# Patient Record
Sex: MALE | Race: WHITE | NOT HISPANIC OR LATINO | URBAN - METROPOLITAN AREA
[De-identification: names, ages, dates, MRNs, and addresses within clinical notes are randomized per-mention and may not be internally consistent; named-entity substitution may affect disease eponyms.]

---

## 2023-09-21 ENCOUNTER — OFFICE (OUTPATIENT)
Dept: URBAN - METROPOLITAN AREA CLINIC 8 | Facility: CLINIC | Age: 43
End: 2023-09-21

## 2023-09-21 VITALS
HEART RATE: 61 BPM | SYSTOLIC BLOOD PRESSURE: 159 MMHG | OXYGEN SATURATION: 96 % | HEIGHT: 73 IN | WEIGHT: 250 LBS | DIASTOLIC BLOOD PRESSURE: 97 MMHG

## 2023-09-21 DIAGNOSIS — K57.92 DIVERTICULITIS OF INTESTINE, PART UNSPECIFIED, WITHOUT PERFO: ICD-10-CM

## 2023-09-21 DIAGNOSIS — K64.9 UNSPECIFIED HEMORRHOIDS: ICD-10-CM

## 2023-09-21 DIAGNOSIS — K59.00 CONSTIPATION, UNSPECIFIED: ICD-10-CM

## 2023-09-21 DIAGNOSIS — R10.32 LEFT LOWER QUADRANT PAIN: ICD-10-CM

## 2023-09-21 PROCEDURE — 99204 OFFICE O/P NEW MOD 45 MIN: CPT | Performed by: STUDENT IN AN ORGANIZED HEALTH CARE EDUCATION/TRAINING PROGRAM

## 2023-09-21 RX ORDER — SODIUM PICOSULFATE, MAGNESIUM OXIDE, AND ANHYDROUS CITRIC ACID 10; 3.5; 12 MG/160ML; G/160ML; G/160ML
LIQUID ORAL
Qty: 350 | Refills: 0 | Status: COMPLETED
Start: 2023-09-21 | End: 2023-10-30

## 2023-09-21 RX ORDER — HYDROCORTISONE ACETATE 25 MG/1
SUPPOSITORY RECTAL
Qty: 7 | Refills: 3 | Status: ACTIVE
Start: 2023-09-21

## 2023-09-21 NOTE — SERVICEHPINOTES
Mr. Willy Sinclair is a 43-year-old white male with past medical history of hypertension, migraines, who presents to [facility] as a referral for left lower quadrant pain.
br
br clinic visit 09/21/2023: 
br patient reports that over the last year he has had 3 distinct episodes of left lower quadrant pain with associated diarrhea but no fevers or chills.  he usually would just go to see a [facility] and they put him on antibiotics for presumed diverticulitis.  He has never had a colonoscopy or CT scan of his abdomen.  Patient reports that his left lower quadrant pain will usually go away after he is on antibiotics.  He does take occasional NSAIDs due to his migraines.  He is using hyoscyamine as needed for crampy abdominal pain.  He has some longstanding chronic constipation and has a BM about 2 times per week.  No bleeding symptoms.  No unintentional weight loss, fevers chills, dysphagia or nausea vomiting.  No family history of GI malignancies or liver disease.  He does not have any previous abdominal surgeries.  He drinks alcohol occasionally but no other recreational drugs or tobacco.  His most recent labs that I reviewed and back to system showed normal liver enzymes.

## 2023-09-21 NOTE — SERVICENOTES
Patient has 3 episodes of acute left lower quadrant pain over the last 1 year.  He reports he has put on antibiotics for the 3 previous episodes and this helped resolve the pain.  He has never had a CT scan before so I am not sure if this is diverticulitis or other causes such as irritable bowel syndrome but we need to do a colonoscopy to further evaluate the presence of diverticulosis.  I counseled the patient on avoiding NSAIDs.  Continue high-fiber.  will prescribe for him Anusol to treat his hemorrhoids and he can use Recticare OTC.   I discussed with the patient the risks and benefits of the procedure including bleeding, infection, anesthesia related complications.  Patient agreed to proceed with the planned procedure.  all questions addressed. Will have him RTC after his procedure.   If not improving by next visit on over-the-counter MiraLax then will consider a trial of trueLance or Linzess.   I personally reviewed the patient's outside medical records for his visit today.

## 2023-10-30 ENCOUNTER — AMBULATORY SURGICAL CENTER (OUTPATIENT)
Dept: URBAN - METROPOLITAN AREA SURGERY 2 | Facility: SURGERY | Age: 43
End: 2023-10-30

## 2023-10-30 ENCOUNTER — OFFICE (OUTPATIENT)
Dept: URBAN - METROPOLITAN AREA PATHOLOGY 12 | Facility: PATHOLOGY | Age: 43
End: 2023-10-30

## 2023-10-30 VITALS
SYSTOLIC BLOOD PRESSURE: 116 MMHG | HEART RATE: 71 BPM | DIASTOLIC BLOOD PRESSURE: 97 MMHG | OXYGEN SATURATION: 98 % | DIASTOLIC BLOOD PRESSURE: 56 MMHG | SYSTOLIC BLOOD PRESSURE: 125 MMHG | HEART RATE: 73 BPM | RESPIRATION RATE: 17 BRPM | TEMPERATURE: 98.2 F | WEIGHT: 240.8 LBS | HEART RATE: 71 BPM | TEMPERATURE: 98.2 F | RESPIRATION RATE: 17 BRPM | OXYGEN SATURATION: 99 % | OXYGEN SATURATION: 100 % | OXYGEN SATURATION: 100 % | OXYGEN SATURATION: 99 % | OXYGEN SATURATION: 98 % | DIASTOLIC BLOOD PRESSURE: 83 MMHG | SYSTOLIC BLOOD PRESSURE: 125 MMHG | SYSTOLIC BLOOD PRESSURE: 146 MMHG | SYSTOLIC BLOOD PRESSURE: 125 MMHG | DIASTOLIC BLOOD PRESSURE: 68 MMHG | SYSTOLIC BLOOD PRESSURE: 113 MMHG | OXYGEN SATURATION: 99 % | HEART RATE: 72 BPM | HEART RATE: 73 BPM | WEIGHT: 240.8 LBS | OXYGEN SATURATION: 98 % | SYSTOLIC BLOOD PRESSURE: 113 MMHG | HEART RATE: 73 BPM | RESPIRATION RATE: 17 BRPM | SYSTOLIC BLOOD PRESSURE: 116 MMHG | DIASTOLIC BLOOD PRESSURE: 56 MMHG | SYSTOLIC BLOOD PRESSURE: 116 MMHG | RESPIRATION RATE: 18 BRPM | OXYGEN SATURATION: 96 % | HEART RATE: 72 BPM | DIASTOLIC BLOOD PRESSURE: 68 MMHG | RESPIRATION RATE: 19 BRPM | HEART RATE: 71 BPM | RESPIRATION RATE: 19 BRPM | HEIGHT: 73 IN | SYSTOLIC BLOOD PRESSURE: 146 MMHG | OXYGEN SATURATION: 100 % | DIASTOLIC BLOOD PRESSURE: 58 MMHG | SYSTOLIC BLOOD PRESSURE: 113 MMHG | WEIGHT: 240.8 LBS | DIASTOLIC BLOOD PRESSURE: 97 MMHG | SYSTOLIC BLOOD PRESSURE: 118 MMHG | HEART RATE: 72 BPM | SYSTOLIC BLOOD PRESSURE: 118 MMHG | DIASTOLIC BLOOD PRESSURE: 83 MMHG | HEART RATE: 76 BPM | TEMPERATURE: 98.2 F | DIASTOLIC BLOOD PRESSURE: 83 MMHG | OXYGEN SATURATION: 96 % | OXYGEN SATURATION: 96 % | HEIGHT: 73 IN | HEART RATE: 69 BPM | RESPIRATION RATE: 18 BRPM | HEART RATE: 76 BPM | DIASTOLIC BLOOD PRESSURE: 58 MMHG | HEIGHT: 73 IN | RESPIRATION RATE: 19 BRPM | RESPIRATION RATE: 18 BRPM | SYSTOLIC BLOOD PRESSURE: 146 MMHG | SYSTOLIC BLOOD PRESSURE: 118 MMHG | HEART RATE: 76 BPM | DIASTOLIC BLOOD PRESSURE: 97 MMHG | DIASTOLIC BLOOD PRESSURE: 58 MMHG | HEART RATE: 69 BPM | DIASTOLIC BLOOD PRESSURE: 68 MMHG | HEART RATE: 69 BPM | DIASTOLIC BLOOD PRESSURE: 56 MMHG

## 2023-10-30 DIAGNOSIS — K64.4 RESIDUAL HEMORRHOIDAL SKIN TAGS: ICD-10-CM

## 2023-10-30 DIAGNOSIS — R10.32 LEFT LOWER QUADRANT PAIN: ICD-10-CM

## 2023-10-30 DIAGNOSIS — K57.92 DIVERTICULITIS OF INTESTINE, PART UNSPECIFIED, WITHOUT PERFO: ICD-10-CM

## 2023-10-30 DIAGNOSIS — D12.2 BENIGN NEOPLASM OF ASCENDING COLON: ICD-10-CM

## 2023-10-30 DIAGNOSIS — K63.5 POLYP OF COLON: ICD-10-CM

## 2023-10-30 PROCEDURE — 88305 TISSUE EXAM BY PATHOLOGIST: CPT | Mod: TC | Performed by: STUDENT IN AN ORGANIZED HEALTH CARE EDUCATION/TRAINING PROGRAM

## 2023-10-30 PROCEDURE — 45385 COLONOSCOPY W/LESION REMOVAL: CPT | Performed by: STUDENT IN AN ORGANIZED HEALTH CARE EDUCATION/TRAINING PROGRAM

## 2023-10-30 NOTE — SERVICEHPINOTES
Mr. Willy Sinclair is a 43-year-old white male with past medical history of hypertension, migraines, who initially presented to [facility] as a referral for left lower quadrant pain.clinic visit 09/21/2023: brpatient reports that over the last year he has had 3 distinct episodes of left lower quadrant pain with associated diarrhea but no fevers or chills.  he usually would just go to see a minor [facility] and they put him on antibiotics for presumed diverticulitis.  He has never had a colonoscopy or CT scan of his abdomen.  Patient reports that his left lower quadrant pain will usually go away after he is on antibiotics.  He does take occasional NSAIDs due to his migraines.  He is using hyoscyamine as needed for crampy abdominal pain.  He has some longstanding chronic constipation and has a BM about 2 times per week.  No bleeding symptoms.  No unintentional weight loss, fevers chills, dysphagia or nausea vomiting.  No family history of GI malignancies or liver disease.  He does not have any previous abdominal surgeries.  He drinks alcohol occasionally but no other recreational drugs or tobacco.  His most recent labs that I reviewed and back to system showed normal liver enzymes.
br
br    Colonoscopy 10/30/23:  
br
Patient feels well. No recent LLQ pain, fever or chills. He reports some irritation of his hemorrhoids due to the prep. No FHx of colon cancer. Not on any blood thinners.

## 2023-11-01 LAB
GASTRO ONE PATHOLOGY: PDF REPORT: (no result)

## 2024-01-25 ENCOUNTER — OFFICE (OUTPATIENT)
Dept: URBAN - METROPOLITAN AREA CLINIC 8 | Facility: CLINIC | Age: 44
End: 2024-01-25

## 2024-01-25 VITALS
DIASTOLIC BLOOD PRESSURE: 88 MMHG | HEIGHT: 73 IN | OXYGEN SATURATION: 94 % | WEIGHT: 255 LBS | SYSTOLIC BLOOD PRESSURE: 131 MMHG | HEART RATE: 71 BPM

## 2024-01-25 DIAGNOSIS — K64.9 UNSPECIFIED HEMORRHOIDS: ICD-10-CM

## 2024-01-25 DIAGNOSIS — K59.00 CONSTIPATION, UNSPECIFIED: ICD-10-CM

## 2024-01-25 DIAGNOSIS — K63.5 POLYP OF COLON: ICD-10-CM

## 2024-01-25 DIAGNOSIS — R12 HEARTBURN: ICD-10-CM

## 2024-01-25 PROCEDURE — 99214 OFFICE O/P EST MOD 30 MIN: CPT | Performed by: STUDENT IN AN ORGANIZED HEALTH CARE EDUCATION/TRAINING PROGRAM

## 2024-01-25 RX ORDER — OMEPRAZOLE 20 MG/1
CAPSULE, DELAYED RELEASE ORAL
Qty: 30 | Refills: 11 | Status: ACTIVE
Start: 2024-01-25

## 2024-01-25 NOTE — SERVICENOTES
patient had recent colonoscopy which was negative for any significant diverticulosis so I think he most likely has spasmodic colon discomfort or irritable bowel syndrome.  He reports that his pain is very well controlled at this time and he has some p.r.n. hyoscyamine that he uses very rarely.  He is using the MiraLax daily and having at least 1 bowel movement per day without difficulty.  He has also supplementing with fiber supplements and reports overall feeling well.  He does have some occasional heartburn and so he would like a prescription for omeprazole which has helped him in the past.  He only uses about once or twice per week as needed.  Will have her return to clinic in 1 year or sooner if needed.  I personally reviewed his previous medical records for his visit today.  Recall colonoscopy is set for 7 years due to 1 tubular adenoma.  All questions addressed.

## 2024-01-25 NOTE — SERVICEHPINOTES
Mr. Willy Sinclair is a 43-year-old white male with past medical history of hypertension, migraines, who initially presented to [facility] as a referral for left lower quadrant pain.clinic visit 09/21/2023: brpatient reports that over the last year he has had 3 distinct episodes of left lower quadrant pain with associated diarrhea but no fevers or chills.  he usually would just go to see a minor [facility] and they put him on antibiotics for presumed diverticulitis.  He has never had a colonoscopy or CT scan of his abdomen.  Patient reports that his left lower quadrant pain will usually go away after he is on antibiotics.  He does take occasional NSAIDs due to his migraines.  He is using hyoscyamine as needed for crampy abdominal pain.  He has some longstanding chronic constipation and has a BM about 2 times per week.  No bleeding symptoms.  No unintentional weight loss, fevers chills, dysphagia or nausea vomiting.  No family history of GI malignancies or liver disease.  He does not have any previous abdominal surgeries.  He drinks alcohol occasionally but no other recreational drugs or tobacco.  His most recent labs that I reviewed and back to system showed normal liver enzymes.Colonoscopy 10/30/23:brPatient feels well. No recent LLQ pain, fever or chills. He reports some irritation of his hemorrhoids due to the prep. No FHx of colon cancer. Not on any blood thinners.

br     Clinic visit 01/25/2024:
br 
  Patient overall is doing very well since his colonoscopy.  He has had very minimal left lower quadrant pain and reports that when he presses on it it completely goes away.  He did not have any significant diverticulosis on his colonoscopy.  He is using the MiraLax for his constipation and reports good results having about 1 bowel movement per day.  He does have occasional heartburn about once a week for many years and has been using omeprazole over-the-counter with good results.  He is asking for a refill on this medication today.  He reports having a breath test done several years ago to check for H pylori but it has been many years.  His colonoscopy showed 1 small tubular adenoma then repeat was recommended in 10 years.  He has not had any rectal bleeding and has not had to use any Anusol suppositories.  He is not taking any NSAIDs.  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

## 2024-01-27 LAB
H PYLORI BREATH TEST: NEGATIVE
H. PYLORI BREATH COLLECTION: (no result)